# Patient Record
Sex: FEMALE | Race: OTHER | Employment: OTHER | URBAN - METROPOLITAN AREA
[De-identification: names, ages, dates, MRNs, and addresses within clinical notes are randomized per-mention and may not be internally consistent; named-entity substitution may affect disease eponyms.]

---

## 2024-06-06 ENCOUNTER — HOSPITAL ENCOUNTER (OUTPATIENT)
Age: 79
Discharge: HOME | End: 2024-06-06
Payer: MEDICARE

## 2024-06-06 VITALS
BODY MASS INDEX: 27.44 KG/M2 | RESPIRATION RATE: 18 BRPM | WEIGHT: 122 LBS | TEMPERATURE: 99 F | HEIGHT: 56 IN | OXYGEN SATURATION: 99 % | HEART RATE: 78 BPM | DIASTOLIC BLOOD PRESSURE: 55 MMHG | SYSTOLIC BLOOD PRESSURE: 118 MMHG

## 2024-06-06 DIAGNOSIS — H10.9 BACTERIAL CONJUNCTIVITIS OF BOTH EYES: Primary | ICD-10-CM

## 2024-06-06 DIAGNOSIS — B96.89 BACTERIAL CONJUNCTIVITIS OF BOTH EYES: Primary | ICD-10-CM

## 2024-06-06 PROBLEM — I95.9 HYPOTENSION: Status: ACTIVE | Noted: 2022-08-18

## 2024-06-06 PROBLEM — M81.0 OSTEOPOROSIS: Status: ACTIVE | Noted: 2021-02-17

## 2024-06-06 PROBLEM — I81 PORTAL VEIN THROMBOSIS: Status: ACTIVE | Noted: 2024-06-06

## 2024-06-06 PROBLEM — K74.5 BILIARY CIRRHOSIS (HCC): Status: ACTIVE | Noted: 2024-06-06

## 2024-06-06 PROBLEM — F03.90 MAJOR NEUROCOGNITIVE DISORDER (HCC): Status: ACTIVE | Noted: 2020-08-18

## 2024-06-06 PROBLEM — K75.81 LIVER CIRRHOSIS SECONDARY TO NASH (HCC): Status: ACTIVE | Noted: 2024-06-06

## 2024-06-06 PROBLEM — J43.2 CENTRILOBULAR EMPHYSEMA (HCC): Status: ACTIVE | Noted: 2023-05-05

## 2024-06-06 PROBLEM — I27.23 PULMONARY HYPERTENSION DUE TO LUNG DISEASES AND HYPOXIA (HCC): Status: ACTIVE | Noted: 2023-05-05

## 2024-06-06 PROBLEM — E11.9 DIABETES MELLITUS (HCC): Status: ACTIVE | Noted: 2024-06-06

## 2024-06-06 PROBLEM — C22.0 HEPATOCELLULAR CARCINOMA (HCC): Status: ACTIVE | Noted: 2020-12-10

## 2024-06-06 PROBLEM — K74.60 LIVER CIRRHOSIS SECONDARY TO NASH (HCC): Status: ACTIVE | Noted: 2024-06-06

## 2024-06-06 PROCEDURE — 99203 OFFICE O/P NEW LOW 30 MIN: CPT

## 2024-06-06 RX ORDER — METFORMIN HYDROCHLORIDE 500 MG/1
500 TABLET, EXTENDED RELEASE ORAL DAILY
COMMUNITY
Start: 2023-10-25

## 2024-06-06 RX ORDER — GABAPENTIN 300 MG/1
300 CAPSULE ORAL 2 TIMES DAILY
COMMUNITY

## 2024-06-06 RX ORDER — SPIRONOLACTONE 50 MG/1
50 TABLET, FILM COATED ORAL EVERY MORNING
COMMUNITY
Start: 2021-07-23

## 2024-06-06 RX ORDER — DORZOLAMIDE HYDROCHLORIDE AND TIMOLOL MALEATE 20; 5 MG/ML; MG/ML
1 SOLUTION/ DROPS OPHTHALMIC 2 TIMES DAILY
COMMUNITY
Start: 2022-04-08

## 2024-06-06 RX ORDER — POLYMYXIN B SULFATE AND TRIMETHOPRIM 1; 10000 MG/ML; [USP'U]/ML
1 SOLUTION OPHTHALMIC
Qty: 1 EACH | Refills: 0 | Status: SHIPPED | OUTPATIENT
Start: 2024-06-06 | End: 2024-06-11

## 2024-06-06 NOTE — ED INITIAL ASSESSMENT (HPI)
Woke up today with eye discharge and pink eye, pt states eyes burn. Has slight pain above the right eye.

## 2024-06-06 NOTE — DISCHARGE INSTRUCTIONS
We recommend the following for your condition:  -  Please use Polytrim drops as directed.   - Wash your hands frequently.  - Practice good hygiene, wash around eye area with gentle soap and water as needed.  - Wear your glasses, avoiding inserting contact lenses for 7 days and until symptoms clear.  - Throw away eye makeup used when had symptoms/drainage.  - If symptoms do not improve within 2 days follow-up with PCP, ophthalmologist, or return to immediate care.  - If symptoms worsen or if you experience eye pain or visual changes please go to ED.

## 2024-06-06 NOTE — ED PROVIDER NOTES
Patient Seen in: [facility]      History     Chief Complaint   Patient presents with    Eye Visual Problem     Stated Complaint: eye issue    Subjective:   Adela Jacinto is a 78 year old female.with a chief complaint of eye redness and drainage that began 2 days ago.  Slight burning sensation at times. This morning pt awoke and her eyes were matted together.   No eye trauma, or chemical splash.   Does not use contacts.   Denies hx of glaucoma.   There is no nasal congestion, sore throat, eye pain, itching, fever or chills.         The history is provided by the patient and a relative. No  was used (daughter interpreted).           Objective:   History reviewed. No pertinent past medical history.           History reviewed. No pertinent surgical history.             Social History     Socioeconomic History    Marital status:    Tobacco Use    Smoking status: Never    Smokeless tobacco: Never   Vaping Use    Vaping status: Never Used   Substance and Sexual Activity    Alcohol use: Never    Drug use: Never     Social Determinants of Health     Financial Resource Strain: Low Risk  (8/9/2022)    Received from [facility]    Overall Financial Resource Strain (CARDIA)     Difficulty of Paying Living Expenses: Not very hard   Food Insecurity: Not on File (1/27/2020)    Received from [facility]     Food Insecurity     Food: 0   Transportation Needs: No Transportation Needs (8/9/2022)    Received from [facility]    PRAPARE - Transportation     Lack of Transportation (Medical): No     Lack of Transportation (Non-Medical): No   Physical Activity: Not on File (1/27/2020)    Received from [facility]     Physical Activity     Physical Activity: 0   Stress: Stress Concern Present (8/9/2022)    Received from [facility]    [employer] - Occupational Stress Questionnaire     Feeling of Stress : To  some extent   Social Connections: Not on File (1/27/2020)    Received from [facility] [facility]     Social Connections and Isolation: 0    Received from [facility]              Review of Systems   Constitutional:  Negative for chills and fever.   HENT:  Negative for congestion and sinus pressure.    Eyes:  Positive for discharge and redness. Negative for pain and visual disturbance.   Respiratory:  Negative for cough and shortness of breath.        Positive for stated complaint: eye issue  Other systems are as noted in HPI.  Constitutional and vital signs reviewed.      All other systems reviewed and negative except as noted above.    Physical Exam     ED Triage Vitals   BP 06/06/24 1430 118/55   Pulse 06/06/24 1441 78   Resp 06/06/24 1430 18   Temp 06/06/24 1430 98.5 °F (36.9 °C)   Temp src 06/06/24 1430 Temporal   SpO2 06/06/24 1441 99 %   O2 Device 06/06/24 1441 None (Room air)       Current Vitals:   Vital Signs  BP: 118/55  Pulse: 78  Resp: 18  Temp: 98.5 °F (36.9 °C)  Temp src: Temporal    Oxygen Therapy  SpO2: 99 %  O2 Device: None (Room air)        Right Eye Chart Acuity: 20/70, Uncorrected  Left Eye Chart Acuity: 20/70, Uncorrected    Physical Exam  Constitutional:       Appearance: Normal appearance.   HENT:      Nose: Nose normal.      Mouth/Throat:      Mouth: Mucous membranes are moist.      Pharynx: No oropharyngeal exudate or posterior oropharyngeal erythema.   Eyes:      General: Lids are normal. Lids are everted, no foreign bodies appreciated.         Right eye: Discharge (purulent drainage L > R) present.         Left eye: Discharge present.     Extraocular Movements: Extraocular movements intact.      Conjunctiva/sclera:      Right eye: Right conjunctiva is injected.      Left eye: Left conjunctiva is injected.      Pupils: Pupils are equal, round, and reactive to light.   Cardiovascular:      Rate and Rhythm: Normal rate and regular rhythm.   Pulmonary:      Effort:  Pulmonary effort is normal.      Breath sounds: Normal breath sounds.   Lymphadenopathy:      Cervical: No cervical adenopathy.   Skin:     General: Skin is warm and dry.   Neurological:      Mental Status: She is alert.   Psychiatric:         Mood and Affect: Mood normal.               ED Course   Labs Reviewed - No data to display                   MDM               Medical Decision Making  Adela Jacinto is a 78 year old female.with a chief complaint of eye redness and drainage that began 2 days ago.  Diff dx include bacterial/viral conjunctivitis vs corneal abrasion.   On exam, pt is well appearing, normal vitals, purulent drainage present from both eyes.   Rx Polytrim as directed, warm compress for eyelash matting, wash hands frequently.   Return to clinic or PCP if not improving in the next week, sooner if worsening   ED precautions discussed with daughter.   All in agreement and understand plan.     Amount and/or Complexity of Data Reviewed  Independent Historian: caregiver  External Data Reviewed: notes.    Risk  OTC drugs.        Disposition and Plan     Clinical Impression:  1. Bacterial conjunctivitis of both eyes         Disposition:  Discharge  6/6/2024  3:07 pm    Follow-up:  PCP      If symptoms worsen          Medications Prescribed:  Current Discharge Medication List        START taking these medications    Details   polymyxin B-trimethoprim 72194-9.1 UNIT/ML-% Ophthalmic Solution Place 1 drop into both eyes Q3H While Awake for 5 days.  Qty: 1 each, Refills: 0